# Patient Record
Sex: MALE | NOT HISPANIC OR LATINO | Employment: FULL TIME | URBAN - METROPOLITAN AREA
[De-identification: names, ages, dates, MRNs, and addresses within clinical notes are randomized per-mention and may not be internally consistent; named-entity substitution may affect disease eponyms.]

---

## 2018-10-08 ENCOUNTER — APPOINTMENT (OUTPATIENT)
Dept: URGENT CARE | Age: 28
End: 2018-10-08
Payer: OTHER MISCELLANEOUS

## 2018-10-08 PROCEDURE — G0382 LEV 3 HOSP TYPE B ED VISIT: HCPCS | Performed by: PREVENTIVE MEDICINE

## 2018-10-08 PROCEDURE — 99283 EMERGENCY DEPT VISIT LOW MDM: CPT | Performed by: PREVENTIVE MEDICINE

## 2019-07-15 ENCOUNTER — OFFICE VISIT (OUTPATIENT)
Dept: FAMILY MEDICINE CLINIC | Facility: CLINIC | Age: 29
End: 2019-07-15
Payer: COMMERCIAL

## 2019-07-15 VITALS
BODY MASS INDEX: 18.78 KG/M2 | RESPIRATION RATE: 16 BRPM | OXYGEN SATURATION: 98 % | HEART RATE: 76 BPM | HEIGHT: 69 IN | TEMPERATURE: 98.2 F | WEIGHT: 126.8 LBS | SYSTOLIC BLOOD PRESSURE: 104 MMHG | DIASTOLIC BLOOD PRESSURE: 64 MMHG

## 2019-07-15 DIAGNOSIS — L60.8 NAIL DISCOLORATION: ICD-10-CM

## 2019-07-15 DIAGNOSIS — F80.81 STUTTER: ICD-10-CM

## 2019-07-15 DIAGNOSIS — B35.1 NAIL FUNGUS: Primary | ICD-10-CM

## 2019-07-15 PROCEDURE — 1036F TOBACCO NON-USER: CPT | Performed by: NURSE PRACTITIONER

## 2019-07-15 PROCEDURE — 99204 OFFICE O/P NEW MOD 45 MIN: CPT | Performed by: NURSE PRACTITIONER

## 2019-07-15 PROCEDURE — 3008F BODY MASS INDEX DOCD: CPT | Performed by: NURSE PRACTITIONER

## 2019-07-15 NOTE — PATIENT INSTRUCTIONS
Have blood work done- if liver enzymes and blood counts are normal, we can prescribe the medication for nail fungus   Call insurance to see which providers in the area accept your insurance and then schedule a Dermatology evaluation

## 2019-07-15 NOTE — ASSESSMENT & PLAN NOTE
Stable, most triggered by social interactions  He does not want to see any specialists for this at this time

## 2019-07-15 NOTE — PROGRESS NOTES
Chief Complaint   Patient presents with   [address] [facility] Patient     Health Maintenance   Topic Date Due    INFLUENZA VACCINE  09/15/2019 (Originally 7/1/2019)    Depression Screening PHQ  07/15/2020    BMI: Adult  07/15/2020    DTaP,Tdap,and Td Vaccines (2 - Td) 10/08/2028    Pneumococcal Vaccine: 65+ Years (1 of 2 - PCV13) 01/18/2053    Pneumococcal Vaccine: Pediatrics (0 to 5 Years) and At-Risk Patients (6 to 59 Years)  Aged Out    HEPATITIS B VACCINES  Aged Out     Assessment/Plan:    UTD with vaccines  Lab work as ordered    Nail fungus  Discussed treatment options today  He prefers to try Lamisil as he has read this is most beneficial   Prior to initiating this, I would like him to have routine lab work done  If liver enzymes and CBC are normal, we can initiate treatment with Lamisil  I did recommend that he still schedule a Dermatologic evaluation as well and he'll reach out to his insurance for local providers       Stutter  Stable, most triggered by social interactions  He does not want to see any specialists for this at this time       Diagnoses and all orders for this visit:    Nail fungus  -     CBC and differential; Future  -     Comprehensive metabolic panel; Future  -     TSH, 3rd generation with Free T4 reflex; Future    Nail discoloration    Stutter          Subjective:      Patient ID: Regan Palencia is a 30 y o  male      HPI     Pt presents by himself today to establish care with our office  He is transferring care from: his Pediatrician, last seen over 10 years ago     Today, he c/o nail fungus on B/L hands and feet  Nails are cracked, dry and have a yellowish/ brown discoloration  Nails tend to be more brittle   Has had for the past few years, he tried to alter his diet with no improvement   Tried Tree Tea Oil with no improvement   He has never seen Dermatology but he did reach out to one local dermatologist office (they did not accept his insurance)    Pt with significant family H/O depression, anxiety and bipolar disorder  He denies any personal H/o psychiatric illness  He does note getting nervous/ anxious on occasion but he feels this is very normal and does not interfere with his life style   Denies feeling depressed   He does have a stutter which he has had for his entire life, worse when in social situations     He reports having a Tdap vaccine 10/2018 after a work-related injury   Works as a  for a [address]     Non smoker  Rare alcohol use, maybe 1-2 drinks per month   Denies illicit drug use        The following portions of the patient's history were reviewed and updated as appropriate: allergies, current medications, past family history, past medical history, past social history, past surgical history and problem list     Review of Systems   Constitutional: Negative for chills, fatigue and fever  Respiratory: Negative for cough, shortness of breath and wheezing  Cardiovascular: Negative for chest pain, palpitations and leg swelling  Gastrointestinal: Negative for abdominal pain, blood in stool, constipation, diarrhea and nausea  Genitourinary: Negative for dysuria  Musculoskeletal: Negative for arthralgias and myalgias  Skin: Negative for rash and wound  As noted in HPI   Neurological: Negative for dizziness, weakness, numbness and headaches  Psychiatric/Behavioral: Negative for sleep disturbance and suicidal ideas  Objective:      /64 (BP Location: Left arm, Patient Position: Sitting, Cuff Size: Adult)   Pulse 76   Temp 98 2 °F (36 8 °C) (Tympanic)   Resp 16   Ht 5' 8 5" (1 74 m)   Wt 57 5 kg (126 lb 12 8 oz)   SpO2 98%   BMI 19 00 kg/m²          Physical Exam   Constitutional: He is oriented to person, place, and time  He appears well-developed and well-nourished  No distress  HENT:   Head: Normocephalic and atraumatic  Eyes: Pupils are equal, round, and reactive to light   Conjunctivae are normal    Neck: Normal range of motion  Neck supple  No thyromegaly present  Cardiovascular: Normal rate, regular rhythm and normal heart sounds  No murmur heard  Pulmonary/Chest: Effort normal and breath sounds normal  No respiratory distress  He has no wheezes  Abdominal: Soft  Bowel sounds are normal  He exhibits no distension  There is no tenderness  Musculoskeletal: Normal range of motion  Lymphadenopathy:     He has no cervical adenopathy  Neurological: He is alert and oriented to person, place, and time  +stutter   Skin: Skin is warm and dry  He is not diaphoretic  B/L fingernails and toenails with a cracked appearance, yellow discoloration   Psychiatric: He has a normal mood and affect

## 2019-07-15 NOTE — ASSESSMENT & PLAN NOTE
Discussed treatment options today  He prefers to try Lamisil as he has read this is most beneficial   Prior to initiating this, I would like him to have routine lab work done  If liver enzymes and CBC are normal, we can initiate treatment with Lamisil  I did recommend that he still schedule a Dermatologic evaluation as well and he'll reach out to his insurance for local providers

## 2019-07-20 ENCOUNTER — APPOINTMENT (OUTPATIENT)
Dept: LAB | Facility: HOSPITAL | Age: 29
End: 2019-07-20
Payer: COMMERCIAL

## 2019-07-20 DIAGNOSIS — B35.1 NAIL FUNGUS: ICD-10-CM

## 2019-07-20 LAB
ALBUMIN SERPL BCP-MCNC: 3.9 G/DL (ref 3.5–5)
ALP SERPL-CCNC: 68 U/L (ref 46–116)
ALT SERPL W P-5'-P-CCNC: 28 U/L (ref 12–78)
ANION GAP SERPL CALCULATED.3IONS-SCNC: 1 MMOL/L (ref 4–13)
AST SERPL W P-5'-P-CCNC: 15 U/L (ref 5–45)
BASOPHILS # BLD AUTO: 0.05 THOUSANDS/ΜL (ref 0–0.1)
BASOPHILS NFR BLD AUTO: 1 % (ref 0–1)
BILIRUB SERPL-MCNC: 0.31 MG/DL (ref 0.2–1)
BUN SERPL-MCNC: 21 MG/DL (ref 5–25)
CALCIUM SERPL-MCNC: 8.6 MG/DL (ref 8.3–10.1)
CHLORIDE SERPL-SCNC: 109 MMOL/L (ref 100–108)
CO2 SERPL-SCNC: 28 MMOL/L (ref 21–32)
CREAT SERPL-MCNC: 1.1 MG/DL (ref 0.6–1.3)
EOSINOPHIL # BLD AUTO: 0.23 THOUSAND/ΜL (ref 0–0.61)
EOSINOPHIL NFR BLD AUTO: 4 % (ref 0–6)
ERYTHROCYTE [DISTWIDTH] IN BLOOD BY AUTOMATED COUNT: 12 % (ref 11.6–15.1)
GFR SERPL CREATININE-BSD FRML MDRD: 89 ML/MIN/1.73SQ M
GLUCOSE P FAST SERPL-MCNC: 88 MG/DL (ref 65–99)
HCT VFR BLD AUTO: 49.1 % (ref 36.5–49.3)
HGB BLD-MCNC: 15.7 G/DL (ref 12–17)
IMM GRANULOCYTES # BLD AUTO: 0.02 THOUSAND/UL (ref 0–0.2)
IMM GRANULOCYTES NFR BLD AUTO: 0 % (ref 0–2)
LYMPHOCYTES # BLD AUTO: 2.02 THOUSANDS/ΜL (ref 0.6–4.47)
LYMPHOCYTES NFR BLD AUTO: 38 % (ref 14–44)
MCH RBC QN AUTO: 29.6 PG (ref 26.8–34.3)
MCHC RBC AUTO-ENTMCNC: 32 G/DL (ref 31.4–37.4)
MCV RBC AUTO: 93 FL (ref 82–98)
MONOCYTES # BLD AUTO: 0.51 THOUSAND/ΜL (ref 0.17–1.22)
MONOCYTES NFR BLD AUTO: 10 % (ref 4–12)
NEUTROPHILS # BLD AUTO: 2.48 THOUSANDS/ΜL (ref 1.85–7.62)
NEUTS SEG NFR BLD AUTO: 47 % (ref 43–75)
NRBC BLD AUTO-RTO: 0 /100 WBCS
PLATELET # BLD AUTO: 161 THOUSANDS/UL (ref 149–390)
PMV BLD AUTO: 11.4 FL (ref 8.9–12.7)
POTASSIUM SERPL-SCNC: 4.2 MMOL/L (ref 3.5–5.3)
PROT SERPL-MCNC: 6.8 G/DL (ref 6.4–8.2)
RBC # BLD AUTO: 5.31 MILLION/UL (ref 3.88–5.62)
SODIUM SERPL-SCNC: 138 MMOL/L (ref 136–145)
TSH SERPL DL<=0.05 MIU/L-ACNC: 1.14 UIU/ML (ref 0.36–3.74)
WBC # BLD AUTO: 5.31 THOUSAND/UL (ref 4.31–10.16)

## 2019-07-20 PROCEDURE — 36415 COLL VENOUS BLD VENIPUNCTURE: CPT

## 2019-07-20 PROCEDURE — 84443 ASSAY THYROID STIM HORMONE: CPT

## 2019-07-20 PROCEDURE — 85025 COMPLETE CBC W/AUTO DIFF WBC: CPT

## 2019-07-20 PROCEDURE — 80053 COMPREHEN METABOLIC PANEL: CPT

## 2019-12-30 ENCOUNTER — APPOINTMENT (OUTPATIENT)
Dept: LAB | Facility: HOSPITAL | Age: 29
End: 2019-12-30
Attending: SPECIALIST
Payer: COMMERCIAL

## 2019-12-30 ENCOUNTER — TRANSCRIBE ORDERS (OUTPATIENT)
Dept: LAB | Facility: HOSPITAL | Age: 29
End: 2019-12-30

## 2019-12-30 DIAGNOSIS — L60.3 SPOONED NAILS: ICD-10-CM

## 2019-12-30 DIAGNOSIS — L60.3 NAIL DYSTROPHY: ICD-10-CM

## 2019-12-30 DIAGNOSIS — L60.3 NAIL DYSTROPHY: Primary | ICD-10-CM

## 2019-12-30 LAB
FERRITIN SERPL-MCNC: 62 NG/ML (ref 8–388)
IRON SERPL-MCNC: 101 UG/DL (ref 65–175)
T4 FREE SERPL-MCNC: 0.87 NG/DL (ref 0.76–1.46)
TIBC SERPL-MCNC: 287 UG/DL (ref 250–450)
TSH SERPL DL<=0.05 MIU/L-ACNC: 1.55 UIU/ML (ref 0.36–3.74)

## 2019-12-30 PROCEDURE — 83550 IRON BINDING TEST: CPT

## 2019-12-30 PROCEDURE — 82728 ASSAY OF FERRITIN: CPT

## 2019-12-30 PROCEDURE — 84443 ASSAY THYROID STIM HORMONE: CPT

## 2019-12-30 PROCEDURE — 84439 ASSAY OF FREE THYROXINE: CPT

## 2019-12-30 PROCEDURE — 83540 ASSAY OF IRON: CPT

## 2019-12-30 PROCEDURE — 36415 COLL VENOUS BLD VENIPUNCTURE: CPT
